# Patient Record
Sex: MALE | Race: WHITE | Employment: FULL TIME | URBAN - METROPOLITAN AREA
[De-identification: names, ages, dates, MRNs, and addresses within clinical notes are randomized per-mention and may not be internally consistent; named-entity substitution may affect disease eponyms.]

---

## 2021-05-01 ENCOUNTER — IMMUNIZATION (OUTPATIENT)
Dept: LAB | Age: 31
End: 2021-05-01
Attending: HOSPITALIST
Payer: COMMERCIAL

## 2021-05-01 DIAGNOSIS — Z23 NEED FOR VACCINATION: Primary | ICD-10-CM

## 2021-05-01 PROCEDURE — 0001A SARSCOV2 VAC 30MCG/0.3ML IM: CPT

## 2021-05-22 ENCOUNTER — IMMUNIZATION (OUTPATIENT)
Dept: LAB | Age: 31
End: 2021-05-22
Attending: HOSPITALIST
Payer: COMMERCIAL

## 2021-05-22 DIAGNOSIS — Z23 NEED FOR VACCINATION: Primary | ICD-10-CM

## 2021-05-22 PROCEDURE — 0002A SARSCOV2 VAC 30MCG/0.3ML IM: CPT

## 2022-01-08 ENCOUNTER — IMMUNIZATION (OUTPATIENT)
Dept: LAB | Facility: HOSPITAL | Age: 32
End: 2022-01-08
Attending: EMERGENCY MEDICINE
Payer: COMMERCIAL

## 2022-01-08 DIAGNOSIS — Z23 NEED FOR VACCINATION: Primary | ICD-10-CM

## 2022-01-08 PROCEDURE — 0054A SARSCOV2 VAC 30MCG/0.3ML IM: CPT

## 2022-01-08 PROCEDURE — 0004A SARSCOV2 VAC 30MCG/0.3ML IM: CPT

## 2024-10-18 ENCOUNTER — OFFICE VISIT (OUTPATIENT)
Facility: LOCATION | Age: 34
End: 2024-10-18

## 2024-10-18 VITALS — WEIGHT: 230 LBS | BODY MASS INDEX: 32.2 KG/M2 | HEIGHT: 71 IN

## 2024-10-18 DIAGNOSIS — H61.22 IMPACTED CERUMEN OF LEFT EAR: ICD-10-CM

## 2024-10-18 DIAGNOSIS — H90.12 CONDUCTIVE HEARING LOSS OF LEFT EAR WITH UNRESTRICTED HEARING OF RIGHT EAR: Primary | ICD-10-CM

## 2024-10-18 DIAGNOSIS — H93.12 TINNITUS OF LEFT EAR: ICD-10-CM

## 2024-10-18 DIAGNOSIS — J34.2 DEVIATED NASAL SEPTUM: ICD-10-CM

## 2024-10-18 NOTE — PROGRESS NOTES
[facility]  OTOLARYNGOLOGY - HEAD & NECK SURGERY    10/18/2024     Reason for Consultation:   Hearing loss, left, intermittent tinnitus    History of Present Illness:   Patient is a pleasant 34 year old male who is being seen for issues with his left ear.  The patient states in general he feels that his hearing has not been sharp.  The patient states that he has to ask his wife to repeat herself.  He has had this problem for many years however he has noticed that it is worsened over the past week.  He has not had any ear pain or drainage.  He denies vertigo.  He has had intermittent tinnitus in the left ear which lasts approximately 30 seconds and then goes away.  He is here for further evaluation and treatment.  He has not had any previous ear surgery.  The patient does note that he has had some nasal trauma and has a deviated septum because of it.    Past Medical History  Past Medical History:    Diverticulitis       Past Surgical History  Past Surgical History:   Procedure Laterality Date    Colonoscopy      2011       Family History  Family History   Problem Relation Age of Onset    Diabetes Father     Cancer Father         colon    Diabetes Mother        Social History  Pediatric History   Patient Parents    Leesa Chow (Mother)     Other Topics Concern    Not on file   Social History Narrative    Not on file           Current Medications:  Current Outpatient Medications   Medication Sig Dispense Refill    Mometasone Furoate (ELOCON) 0.1 % External Cream Apply bid to aa for up to two weeks then STOP 15 g 0    ammonium lactate 12 % External Lotion Apply bid to aa 400 g 3       Allergies  Allergies[1]    Review of Systems:   A comprehensive 10 point review of systems was completed.  Pertinent positives and negatives noted in the the HPI.    Physical Exam:   Height 5' 11\" (1.803 m), weight 230 lb (104.3 kg).    GENERAL: No acute distress, Comfortable appearing  FACE: HB 1/6, Normal Animation  HEAD:  Normocephalic  EYES: EOMI, pupils equil  EARS: Bilateral Auricles Symmetric  NOSE: Nares patent bilaterally, deviated septum to the left, bilateral inferior turbinates appear normal  ORAL CAVITY: Tongue mobile, Oropharynx clear, Floor of mouth clear, Posterior oropharynx normal  NECK: No palpable lymphadenopathy, thyroid not palpable, nontender    OTOMICROSCOPY WITH CERUMEN REMOVAL    Canals:  Right: Canal clear  Left: Canal with cerumen preventing adequate view of TM, debrided with instrumentation as dictated below    Tympanic Membranes:  Right: Normal tympanic membrane.   Left: Normal tympanic membrane.     TM Visualized Method:   Right TM examined via otomicroscopy.    Left TM examined via otomicroscopy.      PROCEDURE: REMOVAL OF CERUMEN IMPACTION  The cerumen impaction was completely removed from the left ear canal using microscopy as necessary.   Removal was completed by using a currette and suction      Results:     Laboratory Data:  No results found for: \"WBC\", \"HGB\", \"HCT\", \"PLT\", \"CREATSERUM\", \"BUN\", \"NA\", \"K\", \"CL\", \"CO2\", \"GLU\", \"CA\", \"ALB\", \"ALKPHO\", \"TP\", \"AST\", \"ALT\", \"PTT\", \"INR\", \"PTP\", \"T4F\", \"TSH\", \"TSHREFLEX\", \"DARRIN\", \"LIP\", \"GGT\", \"PSA\", \"DDIMER\", \"ESRML\", \"ESRPF\", \"CRP\", \"BNP\", \"MG\", \"PHOS\", \"TROP\", \"CK\", \"CKMB\", \"KAY\", \"RPR\", \"B12\", \"ETOH\", \"POCGLU\"      Imaging:  No results found.      Impression:       ICD-10-CM    1. Conductive hearing loss of left ear with unrestricted hearing of right ear  H90.12       2. Impacted cerumen of left ear  H61.22            Recommendations:  The patient appears to have significant impaction of the left canal.  This was debrided using suction and a curette.  The patient feels much improved.  In terms of his tinnitus I would like the patient to keep an eye on this and return to see me if he has any persistent subjective hearing loss or tinnitus.  If that is the case I will have him obtain audiogram.  In terms of his deviated septum does not appear to be bothering  him and we will leave this alone for now.    Thank you for allowing me to participate in the care of your patient.    Wilfredo Sanchez,    Otolaryngology/Rhinology, Sinus, and Endoscopic Skull Base Surgery  [address] [place] [id number]  Phone [phone]  Fax [phone]  10/18/2024  8:33 AM  10/18/2024          [1] No Known Allergies